# Patient Record
Sex: FEMALE
[De-identification: names, ages, dates, MRNs, and addresses within clinical notes are randomized per-mention and may not be internally consistent; named-entity substitution may affect disease eponyms.]

---

## 2023-07-25 ENCOUNTER — APPOINTMENT (OUTPATIENT)
Dept: OTOLARYNGOLOGY | Facility: CLINIC | Age: 33
End: 2023-07-25
Payer: COMMERCIAL

## 2023-07-25 DIAGNOSIS — Z82.49 FAMILY HISTORY OF ISCHEMIC HEART DISEASE AND OTHER DISEASES OF THE CIRCULATORY SYSTEM: ICD-10-CM

## 2023-07-25 DIAGNOSIS — Z78.9 OTHER SPECIFIED HEALTH STATUS: ICD-10-CM

## 2023-07-25 DIAGNOSIS — Z83.3 FAMILY HISTORY OF DIABETES MELLITUS: ICD-10-CM

## 2023-07-25 DIAGNOSIS — Z81.1 FAMILY HISTORY OF ALCOHOL ABUSE AND DEPENDENCE: ICD-10-CM

## 2023-07-25 DIAGNOSIS — H61.21 IMPACTED CERUMEN, RIGHT EAR: ICD-10-CM

## 2023-07-25 DIAGNOSIS — Z82.3 FAMILY HISTORY OF STROKE: ICD-10-CM

## 2023-07-25 DIAGNOSIS — H91.93 UNSPECIFIED HEARING LOSS, BILATERAL: ICD-10-CM

## 2023-07-25 DIAGNOSIS — H69.81 OTHER SPECIFIED DISORDERS OF EUSTACHIAN TUBE, RIGHT EAR: ICD-10-CM

## 2023-07-25 PROBLEM — Z00.00 ENCOUNTER FOR PREVENTIVE HEALTH EXAMINATION: Status: ACTIVE | Noted: 2023-07-25

## 2023-07-25 PROCEDURE — 99024 POSTOP FOLLOW-UP VISIT: CPT

## 2023-07-25 PROCEDURE — 69210 REMOVE IMPACTED EAR WAX UNI: CPT | Mod: RT

## 2023-07-26 PROBLEM — H61.21 IMPACTED CERUMEN OF RIGHT EAR: Status: ACTIVE | Noted: 2023-07-26

## 2023-07-26 PROBLEM — H69.81 DYSFUNCTION OF RIGHT EUSTACHIAN TUBE: Status: ACTIVE | Noted: 2023-07-26

## 2023-07-26 PROBLEM — H91.93 BILATERAL CHANGE IN HEARING: Status: ACTIVE | Noted: 2023-07-26

## 2023-07-26 NOTE — PROCEDURE
[Cerumen Impaction] : Cerumen Impaction [] : Removal of Cerumen [FreeTextEntry5] : Rawls suction #5, Ear curette, Ear alligator

## 2023-07-26 NOTE — REASON FOR VISIT
[Initial Evaluation] : an initial evaluation for [FreeTextEntry2] : Persistent right ear pain and sensation of fullness in the right ear for the past 2 months.  Patient states her level of severity is a level 7 out of 10 and it occurs constant.  Patient states nothing helps to improve or worsens her Persistent right ear pain and sensation of fullness in the right ear for the past 2 months.

## 2023-07-26 NOTE — HISTORY OF PRESENT ILLNESS
[de-identified] : 32 years old female patient with history of Persistent right ear pain and sensation of fullness in the right ear for the past 2 months.  Patient is present today with Eustachium tube dysfunction.  Right ear congestion.  Right ear cerumen impaction \par \par \par

## 2023-07-26 NOTE — PHYSICAL EXAM
[Midline] : trachea located in midline position [Normal] : no rashes [de-identified] : otalgia hearing loss and fullness rt ear 2mo cerumen removed and abx gtts applied

## 2023-08-29 ENCOUNTER — APPOINTMENT (OUTPATIENT)
Dept: OTOLARYNGOLOGY | Facility: CLINIC | Age: 33
End: 2023-08-29
Payer: COMMERCIAL

## 2023-08-29 VITALS
SYSTOLIC BLOOD PRESSURE: 105 MMHG | WEIGHT: 175 LBS | DIASTOLIC BLOOD PRESSURE: 68 MMHG | HEIGHT: 66 IN | BODY MASS INDEX: 28.12 KG/M2 | TEMPERATURE: 98.4 F | OXYGEN SATURATION: 98 % | HEART RATE: 69 BPM

## 2023-08-29 DIAGNOSIS — H92.01 OTALGIA, RIGHT EAR: ICD-10-CM

## 2023-08-29 DIAGNOSIS — H93.8X1 OTHER SPECIFIED DISORDERS OF RIGHT EAR: ICD-10-CM

## 2023-08-29 DIAGNOSIS — H60.8X1 OTHER OTITIS EXTERNA, RIGHT EAR: ICD-10-CM

## 2023-08-29 PROCEDURE — 69210 REMOVE IMPACTED EAR WAX UNI: CPT | Mod: RT

## 2023-08-29 PROCEDURE — 99213 OFFICE O/P EST LOW 20 MIN: CPT | Mod: 25

## 2023-08-29 NOTE — REASON FOR VISIT
[Subsequent Evaluation] : a subsequent evaluation for [FreeTextEntry2] : Persistent right ear pain and sensation of fullness in the right ear for the past couple of days

## 2023-08-29 NOTE — PROCEDURE
[] : Binocular Microscopy [FreeTextEntry5] : Right ear debridement castillo suction #5, Ear curette, Ear alligator

## 2023-08-29 NOTE — PHYSICAL EXAM
[Midline] : trachea located in midline position [Normal] : no rashes [de-identified] : otalgia hearing loss and fullness rt ear 2mo cerumen removed and abx gtts applied

## 2023-08-29 NOTE — HISTORY OF PRESENT ILLNESS
[de-identified] : 32 years old female patient with history of Persistent right ear pain and sensation of fullness in the right ear for the past couple of days   Patient is present today with Eustachian tube dysfunction.  Right Otitis externa.  right ear debridement was performed.